# Patient Record
Sex: FEMALE
[De-identification: names, ages, dates, MRNs, and addresses within clinical notes are randomized per-mention and may not be internally consistent; named-entity substitution may affect disease eponyms.]

---

## 2018-04-27 ENCOUNTER — HOSPITAL ENCOUNTER (EMERGENCY)
Dept: HOSPITAL 97 - ER | Age: 23
Discharge: HOME | End: 2018-04-27
Payer: SELF-PAY

## 2018-04-27 DIAGNOSIS — R07.9: Primary | ICD-10-CM

## 2018-04-27 DIAGNOSIS — I10: ICD-10-CM

## 2018-04-27 PROCEDURE — 99284 EMERGENCY DEPT VISIT MOD MDM: CPT

## 2018-04-27 PROCEDURE — 93005 ELECTROCARDIOGRAM TRACING: CPT

## 2018-04-27 NOTE — ER
Nurse's Notes                                                                                     

 [facility]                                                                

Name: Annel Sam                                                                                 

Age: 22 yrs                                                                                       

Sex: Female                                                                                       

: 1995                                                                                   

MRN: [id number]                                                                                   

Arrival Date: 2018                                                                          

Time: 21:01                                                                                       

Account#: [id number]                                                                            

Bed 15                                                                                            

Private MD:                                                                                       

Diagnosis: Palpitations                                                                           

                                                                                                  

Presentation:                                                                                     

                                                                                             

21:09 Presenting complaint: Patient states: Chest pain that began around 1630 today with left lp1 

      arm tingling, bilateral legs tingling, dizzy, vomited x1; States seen by cardiologist,      

      Dr. Ortiz, and told she has a "heart problem", unknown medication prescribed, has not      

      started medication; Patient states she has been under a lot of stress at home; Symptoms     

      resolving during triage. Transition of care: patient was not received from another          

      setting of care. Onset of symptoms was 2018 at 16:30. Initial Sepsis Screen:      

      Does the patient meet any 2 criteria? No. Patient's initial sepsis screen is negative.      

      Does the patient have a suspected source of infection? No. Patient's initial sepsis         

      screen is negative. Care prior to arrival: None.                                            

21:09 Method Of Arrival: Ambulatory                                                           lp1 

21:09 Acuity: JOS 3                                                                           lp1 

                                                                                                  

OB/GYN:                                                                                           

21:12 LMP 2018                                                                           lp1 

                                                                                                  

Historical:                                                                                       

- Allergies:                                                                                      

21:12 No Known Allergies;                                                                     lp1 

- Home Meds:                                                                                      

21:12 None [Active];                                                                          lp1 

- PMHx:                                                                                           

21:12 "heart problem";                                                                        lp1 

- PSHx:                                                                                           

21:12 Tonsillectomy; Ear Tubes;                                                               lp1 

                                                                                                  

- Immunization history:: Adult Immunizations up to date.                                          

- Social history:: Smoking status: Patient/guardian denies using tobacco.                         

                                                                                                  

                                                                                                  

Screenin:13 Abuse screen: Denies threats or abuse. Denies injuries from another. Nutritional        lp1 

      screening: No deficits noted. Tuberculosis screening: No symptoms or risk factors           

      identified. Fall Risk None identified.                                                      

                                                                                                  

Assessment:                                                                                       

21:45 General: Appears in no apparent distress. uncomfortable, Behavior is cooperative,       tl2 

      appropriate for age, anxious. General: Pt states, I got my test results back today from     

      the cardiologist but I don't know what they mean and He gave me a pill but I don't know     

      what it is and I didn't take it. Pt reports increased stress with her family. Pain:         

      Complains of pain in left chest Pain radiates to left arm Pain began 1 day ago. Neuro:      

      Level of Consciousness is awake, alert, obeys commands, Oriented to person, place,          

      time, situation. Cardiovascular: Heart tones S1 S2 present Rhythm is sinus rhythm Chest     

      pain is described as mild, quality is sharp, is located in left radiates to left            

      arm(s). Respiratory: Airway is patent Respiratory effort is even, unlabored,                

      Respiratory pattern is regular, symmetrical. GI: No signs and/or symptoms were reported     

      involving the gastrointestinal system. : No signs and/or symptoms were reported           

      regarding the genitourinary system. Derm: Skin is pink, warm \T\ dry.                       

22:30 Reassessment: Patient appears in no apparent distress at this time. Patient and/or      tl2 

      family updated on plan of care and expected duration. Pain level reassessed. Patient is     

      alert, oriented x 3, equal unlabored respirations, skin warm/dry/pink. Pt states pain       

      has gone away. Appears calm and relaxed Patient denies pain at this time. Patient           

      states feeling better.                                                                      

22:58 Reassessment: Patient appears in no apparent distress at this time. Patient and/or      tl2 

      family updated on plan of care and expected duration. Pain level reassessed. Patient is     

      alert, oriented x 3, equal unlabored respirations, skin warm/dry/pink. Pt verbalized        

      understanding of discharge instructions and need for follow up.                             

                                                                                                  

Vital Signs:                                                                                      

21:12  / 83; Pulse 88; Resp 18; Temp 98.8(O); Pulse Ox 98% on R/A; Weight 58.97 kg;     lp1 

      Height 5 ft. 2 in. (157.48 cm); Pain 4/10;                                                  

22:58  / 73; Pulse 87; Resp 17; Pulse Ox 98% on R/A; Pain 0/10;                         tl2 

21:12 Body Mass Index 23.78 (58.97 kg, 157.48 cm)                                             lp1 

                                                                                                  

ED Course:                                                                                        

21:01 Patient arrived in ED.                                                                  am2 

21:11 Triage completed.                                                                       lp1 

21:12 Arm band placed on right wrist.                                                         lp1 

21:13 Patient maintains SpO2 saturation greater than 95% on room air.                         lp1 

21:30 Patient has correct armband on for positive identification. Placed in gown. Bed in low  tl2 

      position. Call light in reach. Side rails up X 1. Pulse ox on. NIBP on.                     

21:30 No provider procedures requiring assistance completed.                                  tl2 

22:01 [facility]                                                        snw 

22:01 Robinson Lara MD is Attending Physician.                                             snw 

22:54 Antonia Frank, RN is Primary Nurse.                                                      tl2 

23:00 Patient did not have IV access during this emergency room visit.                        tl2 

                                                                                                  

Administered Medications:                                                                         

No medications were administered                                                                  

                                                                                                  

                                                                                                  

Outcome:                                                                                          

:27 Discharge ordered by MD.                                                                snw 

23:00 Discharged to home ambulatory, with family.                                             tl2 

23:00 Condition: stable                                                                           

23:00 Discharge instructions given to patient, family, Instructed on discharge instructions,      

      follow up and referral plans. Demonstrated understanding of instructions, follow-up         

      care.                                                                                       

23:01 Patient left the ED.                                                                    tl2 

                                                                                                  

Signatures:                                                                                       

Vita Jason FNP-C                 FNP-Csnw                                                  

Sonia Alvarez RN                         RN   lp1                                                  

Antonia Frank RN                        RN   tl2                                                  

Deann Wei                               am2                                                  

                                                                                                  

Corrections: (The following items were deleted from the chart)                                    

21:14 21:09 Presenting complaint: Patient states: Chest pain that began around 1630 today     lp1 

      with left arm tingling, bilateral legs tingling, dizzy, vomited x1; States seen by          

      cardiologist and told she has a "heart problem", unknown medication prescribed, has not     

      started medication; Patient states she has been under a lot of stress at home; Symptoms     

      resolving during triage lp1                                                                 

                                                                                                  

**************************************************************************************************

## 2018-04-27 NOTE — EDPHYS
Physician Documentation                                                                           

 [facility]                                                                

Name: Annel Sam                                                                                 

Age: 22 yrs                                                                                       

Sex: Female                                                                                       

: 1995                                                                                   

MRN: [id number]                                                                                   

Arrival Date: 2018                                                                          

Time: 21:01                                                                                       

Account#: [id number]                                                                            

Bed 15                                                                                            

Private MD:                                                                                       

ED Physician Robinson Lara                                                                      

HPI:                                                                                              

                                                                                             

23:43 This 22 yrs old  Female presents to ER via Ambulatory with complaints of Chest  snw 

      Pain, Numbness Of Arm.                                                                      

23:43 The patient presents with a history of heart racing. Context: The symptoms occur at     snw 

      rest, with anxiety. Onset: The symptoms/episode began/occurred suddenly, 1 month(s)         

      ago, and became persistent. Duration: The patient or guardian reports multiple              

      episodes, that wax and wane, with no pattern. Modifying factors: The symptoms are           

      aggravated by anxiety, stress. Severity of symptoms: At their worst the symptoms were       

      moderate. The patient has experienced similar episodes in the past, several times. The      

      patient has been recently seen by a physician: a cardiologist.                              

                                                                                                  

OB/GYN:                                                                                           

21:12 LMP 2018                                                                           lp1 

                                                                                                  

Historical:                                                                                       

- Allergies:                                                                                      

21:12 No Known Allergies;                                                                     lp1 

- Home Meds:                                                                                      

21:12 None [Active];                                                                          lp1 

- PMHx:                                                                                           

21:12 "heart problem";                                                                        lp1 

- PSHx:                                                                                           

21:12 Tonsillectomy; Ear Tubes;                                                               lp1 

                                                                                                  

- Immunization history:: Adult Immunizations up to date.                                          

- Social history:: Smoking status: Patient/guardian denies using tobacco.                         

                                                                                                  

                                                                                                  

ROS:                                                                                              

23:39 Constitutional: Negative for fever, chills, and weight loss, Eyes: Negative for injury, snw 

      pain, redness, and discharge, ENT: Negative for injury, pain, and discharge, Neck:          

      Negative for injury, pain, and swelling.                                                    

23:39 Respiratory: Negative for shortness of breath, cough, wheezing, and pleuritic chest         

      pain, Abdomen/GI: Negative for abdominal pain, nausea, vomiting, diarrhea, and              

      constipation, Back: Negative for injury and pain, : Negative for injury, bleeding,        

      discharge, and swelling, Skin: Negative for injury, rash, and discoloration, Neuro:         

      Negative for headache, weakness, numbness, tingling, and seizure.                           

23:39 Cardiovascular: Positive for chest pain, palpitations.                                      

23:39 MS/extremity: Positive for paresthesias, tingling, of the left arm and left leg.            

                                                                                                  

Exam:                                                                                             

23:39 Constitutional:  This is a well developed, well nourished patient who is awake, alert,  snw 

      and in no acute distress. Head/Face:  Normocephalic, atraumatic. Eyes:  Pupils equal        

      round and reactive to light, extra-ocular motions intact.  Lids and lashes normal.          

      Conjunctiva and sclera are non-icteric and not injected.  Cornea within normal limits.      

      Periorbital areas with no swelling, redness, or edema. ENT:  Nares patent. No nasal         

      discharge, no septal abnormalities noted.  Tympanic membranes are normal and external       

      auditory canals are clear.  Oropharynx with no redness, swelling, or masses, exudates,      

      or evidence of obstruction, uvula midline.  Mucous membranes moist. Neck:  Trachea          

      midline, no thyromegaly or masses palpated, and no cervical lymphadenopathy.  Supple,       

      full range of motion without nuchal rigidity, or vertebral point tenderness.  No            

      Meningismus. Chest/axilla:  Normal chest wall appearance and motion.  Nontender with no     

      deformity.  No lesions are appreciated. Cardiovascular:  Regular rate and rhythm with a     

      normal S1 and S2.  No gallops, murmurs, or rubs.  Normal PMI, no JVD.  No pulse             

      deficits. Respiratory:  Lungs have equal breath sounds bilaterally, clear to                

      auscultation and percussion.  No rales, rhonchi or wheezes noted.  No increased work of     

      breathing, no retractions or nasal flaring. Abdomen/GI:  Soft, non-tender, with normal      

      bowel sounds.  No distension or tympany.  No guarding or rebound.  No evidence of           

      tenderness throughout. Back:  No spinal tenderness.  No costovertebral tenderness.          

      Full range of motion. Skin:  Warm, dry with normal turgor.  Normal color with no            

      rashes, no lesions, and no evidence of cellulitis. MS/ Extremity:  Pulses equal, no         

      cyanosis.  Neurovascular intact.  Full, normal range of motion. Neuro:  Awake and           

      alert, GCS 15, oriented to person, place, time, and situation.  Cranial nerves II-XII       

      grossly intact.  Motor strength 5/5 in all extremities.  Sensory grossly intact.            

      Cerebellar exam normal.  Normal gait.                                                       

                                                                                                  

Vital Signs:                                                                                      

21:12  / 83; Pulse 88; Resp 18; Temp 98.8(O); Pulse Ox 98% on R/A; Weight 58.97 kg;     lp1 

      Height 5 ft. 2 in. (157.48 cm); Pain 4/10;                                                  

22:58  / 73; Pulse 87; Resp 17; Pulse Ox 98% on R/A; Pain 0/10;                         tl2 

21:12 Body Mass Index 23.78 (58.97 kg, 157.48 cm)                                             lp1 

                                                                                                  

MDM:                                                                                              

22:01 Patient medically screened.                                                             snw 

23:40 Data reviewed: vital signs, nurses notes. Data interpreted: Pulse oximetry: on room air snw 

      is 98 %. Interpretation: normal. Counseling: I had a detailed discussion with the           

      patient and/or guardian regarding: the historical points, exam findings, and any            

      diagnostic results supporting the discharge/admit diagnosis, the need for outpatient        

      follow up, to return to the emergency department if symptoms worsen or persist or if        

      there are any questions or concerns that arise at home. Special discussion: Based on        

      the history and exam findings, there is no indication for further emergent testing or       

      inpatient evaluation. I discussed with the patient/guardian the need to see the             

      cardiologist for further evaluation of the symptoms. I discussed with the                   

      patient/guardian the need to see the primary care provider for further evaluation of        

      the symptoms. ED course: Pt saw Dr. Ortiz. Dx with palpitations. Work up performed and     

      pt was given results and "new medicine" today. Pt has not taken medications and is now      

      pain free in the ED. .                                                                      

                                                                                                  

Administered Medications:                                                                         

No medications were administered                                                                  

                                                                                                  

                                                                                                  

Disposition:                                                                                      

18 22:27 Discharged to Home. Impression: Palpitations.                                      

- Condition is Stable.                                                                            

- Discharge Instructions: Nonspecific Chest Pain, Hypertension, Palpitations,                     

  Generalized Anxiety Disorder.                                                                   

                                                                                                  

- Medication Reconciliation Form, Thank You Letter, Antibiotic Education, Prescription            

  Opioid Use form.                                                                                

- Follow up: Private Physician; When: 2 - 3 days; Reason: Recheck today's complaints,             

  Continuance of care, Re-evaluation by your physician. Follow up: Emergency                      

  Department; When: As needed; Reason: Worsening of condition.                                    

                                                                                                  

                                                                                                  

                                                                                                  

Addendum:                                                                                         

2018                                                                                        

     06:39 Co-signature as Attending Physician, Robinson Lara MD Available for consultation at    p
s1

           all times. .                                                                           

                                                                                                  

Signatures:                                                                                       

Vita Jason, FNP-C                 FNP-Csnw                                                  

Sonia Alvarez RN                         RN   lp1                                                  

Antonia Frank RN                        RN   tl2                                                  

Singer, Robinson, MD                     MD   ps1                                                  

                                                                                                  

**************************************************************************************************

## 2018-04-29 NOTE — EKG
Test Date:    2018-04-27               Test Time:    21:50:36

Technician:   CHARLEY                                    

                                                     

MEASUREMENT RESULTS:                                       

Intervals:                                           

Rate:         74                                     

NE:           154                                    

QRSD:         82                                     

QT:           382                                    

QTc:          424                                    

Axis:                                                

P:            47                                     

NE:           154                                    

QRS:          65                                     

T:            30                                     

                                                     

INTERPRETIVE STATEMENTS:                                       

                                                     

Normal sinus rhythm

Normal ECG

No previous ECG available for comparison



Electronically Signed On 04-29-18 16:13:53 CDT by Johnathan Gamble